# Patient Record
Sex: FEMALE | Race: WHITE | NOT HISPANIC OR LATINO | Employment: FULL TIME | URBAN - METROPOLITAN AREA
[De-identification: names, ages, dates, MRNs, and addresses within clinical notes are randomized per-mention and may not be internally consistent; named-entity substitution may affect disease eponyms.]

---

## 2024-06-07 RX ORDER — HYDROXYCHLOROQUINE SULFATE 200 MG/1
TABLET, FILM COATED ORAL
Qty: 180 TABLET | Refills: 0 | Status: SHIPPED | OUTPATIENT
Start: 2024-06-07

## 2024-07-24 PROBLEM — Z79.899 ENCOUNTER FOR LONG-TERM (CURRENT) USE OF HIGH-RISK MEDICATION: Status: ACTIVE | Noted: 2024-07-24

## 2024-07-24 PROBLEM — E55.9 VITAMIN D DEFICIENCY: Status: ACTIVE | Noted: 2024-07-24

## 2024-07-24 PROBLEM — M05.9 RHEUMATOID ARTHRITIS WITH POSITIVE RHEUMATOID FACTOR: Status: ACTIVE | Noted: 2024-07-24

## 2024-07-24 PROBLEM — G40.909 SEIZURE DISORDER: Status: ACTIVE | Noted: 2024-07-24

## 2024-07-24 PROBLEM — M85.80 OSTEOPENIA: Status: ACTIVE | Noted: 2024-07-24

## 2024-07-24 PROBLEM — Z79.1 ENCOUNTER FOR LONG-TERM (CURRENT) USE OF NSAIDS: Status: ACTIVE | Noted: 2024-07-24

## 2024-07-24 NOTE — ASSESSMENT & PLAN NOTE
[facility] mckinley note  referred by hand specialist Dr. Darin JI ([place]) who her  saw for thumb  +RF 41, +CCP antibody>300 (Dec 2019); saw Dr. Palmer once February 2020 2/2020 hand films with mild deg changes  2/2024 hand films with inflammatory changes multiple PIP joints  **Current:  Methotrexate 7/2020, Plaquenil 3/21, leucovorin, ibuprofen PRN.    Low disease activity on methotrexate and Plaquenil.    Swollen joint count 1. Tender joint count 0.  Good prognosis  She previously tried to come off Plaquenil but had worsening joint pain, so she restarted the Plaquenil  Continue methotrexate and Plaquenil.  Well tolerated.    We lowered methotrexate dose to 4 tablets once weekly and prescribed once weekly leucovorin in light of her nausea complaint. Nausea has resolved.  Continue yearly eye exam for Plaquenil monitoring- UTD 9/28/23  Labs every 8-12 weeks for monitoring.   Labs 6/7/2024 stable.  Standing order provided today.  RTC 4-6 months

## 2024-07-24 NOTE — ASSESSMENT & PLAN NOTE
Continue ibuprofen as needed    Risks of NSAIDs discussed including GI upset, GI bleeding, renal and hepatic risks and the risks of cardiovascular disease and stroke. Warned patient not to take with other NSAIDs including OTC NSAIDs

## 2024-07-24 NOTE — ASSESSMENT & PLAN NOTE
DEXA 2020 [facility] - mild osteopenia  DEXA ACL 9/14/2023-normal bone density  Vitamin D weekly helps her hip pain, so she will stay on this indefinitely.    She takes 50,000 IU of vitamin D weekly- She reports when she does not take her weekly vitamin D for a couple weeks, she has severe joint pain and stiffness  Continue calcium vitamin-D and weight-bearing exercise

## 2024-07-24 NOTE — PROGRESS NOTES
Office Visit       Date: 07/25/2024   Patient Name: Alexa Camargo  MRN: [id number]  YOB: 1965    Referring Physician: Carmelina Choudhary APRN     Chief Complaint: No chief complaint on file.      History of Present Illness: Alexa Camargo is a 58 y.o. female who is here today for follow-up of seropositive rheumatoid arthritis on methotrexate and Plaquenil.      Today she rates her pain 0/10.  She denies morning stiffness.  She is tolerating Plaquenil and methotrexate well. Her nausea has improved now that she is taking leucovorin weekly.   No rheumatoid flares in the interim.  No recent infections.  Shoulder pain and CMC pain has improved.    She takes ibuprofen as needed, but she uses this rarely.  She is taking prescription vitamin D weekly which really helps her joint pain.  She notes nodule right forearm and left bicep.      Subjective   Review of systems:  Review of Systems   All other systems reviewed and are negative.      Past Medical History:   Past Medical History:   Diagnosis Date    Anxiety     panic attacks    Low vitamin D level     Osteopenia     Seizure disorder     Seropositive rheumatoid arthritis        Past Surgical History:   Past Surgical History:   Procedure Laterality Date    APPENDECTOMY      DILATATION AND CURETTAGE         Family History:   Family History   Problem Relation Age of Onset    Osteoporosis Mother     Heart failure Mother     Seizures Mother         disorder    Arthritis Mother     Heart failure Father     Diabetes Father     Arthritis Father     Skin cancer Father     Hypertension Sister     Other Brother         acid reflux       Social History:   Social History     Socioeconomic History    Marital status:    Tobacco Use    Smoking status: Never    Smokeless tobacco: Never   Vaping Use    Vaping status: Never Used   Substance and Sexual Activity    Alcohol use: Never    Drug use: Never    Sexual activity: Defer       Medications:   Current  "Outpatient Medications:     estradiol-norethindrone (ACTIVELLA) 1-0.5 MG per tablet, Take 1 tablet by mouth Daily., Disp: , Rfl:     hydroxychloroquine (PLAQUENIL) 200 MG tablet, Take 1 tablet by mouth 2 (Two) Times a Day., Disp: 180 tablet, Rfl: 0    ibuprofen (ADVIL,MOTRIN) 800 MG tablet, Take 1 tablet by mouth 3 (Three) Times a Day As Needed for Mild Pain. With food, Disp: 90 tablet, Rfl: 3    lamoTRIgine (LaMICtal) 200 MG tablet, Take 1 tablet by mouth 2 (Two) Times a Day., Disp: , Rfl:     leucovorin 10 MG tablet, Take 1 tablet by mouth 1 (One) Time Per Week. Take 24 hrs after methotrexate dose, Disp: 4 tablet, Rfl: 3    methotrexate 2.5 MG tablet, Take 4 tablets by mouth 1 (One) Time Per Week. For rheumatoid arthritis, Disp: 20 tablet, Rfl: 3    primidone (MYSOLINE) 250 MG tablet, Take 1 tablet by mouth 2 (Two) Times a Day. For maintenance, Disp: , Rfl:     calcium carbonate (OS-VENANCIO) 1250 (500 Ca) MG tablet, Take 1 tablet by mouth Daily., Disp: , Rfl:     ergocalciferol (ERGOCALCIFEROL) 1.25 MG (69244 UT) capsule, Take 1 capsule by mouth 1 (One) Time Per Week., Disp: 4 capsule, Rfl: 3    Allergies:   Allergies   Allergen Reactions    Amoxicillin Unknown - Low Severity    Nitrofurantoin Macrocrystal Unknown - Low Severity       I reviewed the patient's chief complaint, history of present illness, review of systems, past medical history, surgical history, family history, social history, medications and allergy list.     Objective    Vital Signs:   Vitals:    07/25/24 1248   BP: 138/80   BP Location: Left arm   Pulse: 72   Temp: 98.2 °F (36.8 °C)   Weight: 77.1 kg (170 lb)   Height: 160 cm (63\")   PainSc: 0-No pain     Body mass index is 30.11 kg/m².   BMI cannot be calculated due to outdated height or weight values.  Please input a current height/weight in Vitals and re-renter BMIFOLLOWUP in Note to pull in correct documentation based on BMI range.       Physical Exam:  Physical Exam  Constitutional:       " Appearance: Normal appearance.   HENT:      Head: Normocephalic and atraumatic.   Eyes:      Conjunctiva/sclera: Conjunctivae normal.      Pupils: Pupils are equal, round, and reactive to light.   Cardiovascular:      Rate and Rhythm: Normal rate and regular rhythm.      Heart sounds: Normal heart sounds.   Pulmonary:      Effort: Pulmonary effort is normal.      Breath sounds: Normal breath sounds.   Musculoskeletal:         General: Normal range of motion.      Cervical back: Normal range of motion.      Comments: No synovitis. No joint deformity.   Mild synovitis right third PIP  Osteoarthritic changes seen in the hands  ?  Rheumatoid nodule left bicep and right forearm   Skin:     General: Skin is warm and dry.   Neurological:      General: No focal deficit present.      Mental Status: She is alert and oriented to person, place, and time.   Psychiatric:         Mood and Affect: Mood normal.         Behavior: Behavior normal.         Thought Content: Thought content normal.         Judgment: Judgment normal.         Metrics  MATIAS-28 (ESR): 0.77 (Remission)  MATIAS-28 (CRP): 1.49 (Remission)  Tender (MATIAS-28): 0 / 28   Swollen (MATIAS-28): 1 / 28     This Exam  Provider Global: 1 mm  Patient Global: 0 mm  ESR: 2 mm/hr  CRP: 1 mg/L         Results Review:   Imaging Results (Last 24 Hours)       ** No results found for the last 24 hours. **            Assessment / Plan    Assessment/Plan:   Diagnoses and all orders for this visit:    1. Rheumatoid arthritis with positive rheumatoid factor, involving unspecified site (Primary)  Assessment & Plan:   [employer] [place]  referred by hand specialist Dr. Darin JI ([facility]) who her  saw for thumb  +RF 41, +CCP antibody>300 (Dec 2019); saw Dr. Palmer once February 2020 2/2020 hand films with mild deg changes  2/2024 hand films with inflammatory changes multiple PIP joints  **Current:  Methotrexate 7/2020, Plaquenil 3/21, leucovorin,  ibuprofen PRN.    Low disease activity on methotrexate and Plaquenil.    Swollen joint count 1. Tender joint count 0.  Good prognosis  She previously tried to come off Plaquenil but had worsening joint pain, so she restarted the Plaquenil  Continue methotrexate and Plaquenil.  Well tolerated.    We lowered methotrexate dose to 4 tablets once weekly and prescribed once weekly leucovorin in light of her nausea complaint. Nausea has resolved.  Continue yearly eye exam for Plaquenil monitoring- UTD 9/28/23  Labs every 8-12 weeks for monitoring.   Labs 6/7/2024 stable.  Standing order provided today.  RTC 4-6 months    Orders:  -     leucovorin 10 MG tablet; Take 1 tablet by mouth 1 (One) Time Per Week. Take 24 hrs after methotrexate dose  Dispense: 4 tablet; Refill: 3  -     hydroxychloroquine (PLAQUENIL) 200 MG tablet; Take 1 tablet by mouth 2 (Two) Times a Day.  Dispense: 180 tablet; Refill: 0  -     ibuprofen (ADVIL,MOTRIN) 800 MG tablet; Take 1 tablet by mouth 3 (Three) Times a Day As Needed for Mild Pain. With food  Dispense: 90 tablet; Refill: 3  -     methotrexate 2.5 MG tablet; Take 4 tablets by mouth 1 (One) Time Per Week. For rheumatoid arthritis  Dispense: 20 tablet; Refill: 3  -     Comprehensive Metabolic Panel; Standing  -     C-reactive Protein; Standing  -     Sedimentation Rate; Standing  -     CBC Auto Differential; Standing    2. Encounter for long-term (current) use of high-risk medication  Assessment & Plan:  Continue Plaquenil and methotrexate.  Continue labs every 8 to 12 weeks.  Continue annual eye exams.    We have discussed the side effects of hydroxychloroquine including but not limited to GI upset, rash, photosensitivity, Hematologic and liver abnormalities and ocular abnormalities and need for frequent eye exam for toxicity monitoring    Discussed risks of methotrexate.  Risks include but are not limited to severe liver damage that can be fatal, the possible need for liver biopsy, bone  marrow suppression that can lead to dangerously low blood counts, GI side effects including mouth sores and diarrhea, fatigue, and rare risk of severe pulmonary complications. There should be no alcohol consumed with MTX. MTX can cause severe fetal abnormalities whether the mother or father is taking the medication and thus must be avoided if pregnancy is a possibility.  All medication is to be taken one day a week only. The need for q 8 week labs and the need for folic acid supplementation were discussed    Orders:  -     leucovorin 10 MG tablet; Take 1 tablet by mouth 1 (One) Time Per Week. Take 24 hrs after methotrexate dose  Dispense: 4 tablet; Refill: 3  -     hydroxychloroquine (PLAQUENIL) 200 MG tablet; Take 1 tablet by mouth 2 (Two) Times a Day.  Dispense: 180 tablet; Refill: 0  -     methotrexate 2.5 MG tablet; Take 4 tablets by mouth 1 (One) Time Per Week. For rheumatoid arthritis  Dispense: 20 tablet; Refill: 3  -     Comprehensive Metabolic Panel; Standing  -     C-reactive Protein; Standing  -     Sedimentation Rate; Standing  -     CBC Auto Differential; Standing    3. Osteopenia, unspecified location  Assessment & Plan:  DEXA 2020 [facility] - mild osteopenia  DEXA ACL 9/14/2023-normal bone density  Vitamin D weekly helps her hip pain, so she will stay on this indefinitely.    She takes 50,000 IU of vitamin D weekly- She reports when she does not take her weekly vitamin D for a couple weeks, she has severe joint pain and stiffness  Continue calcium vitamin-D and weight-bearing exercise      4. Vitamin D deficiency  Assessment & Plan:  Continue once weekly vitamin D2 50,000 units. She can definitely tell when she does not take the weekly vitamin-D.  3/8/2024 level was 73.7    Orders:  -     ergocalciferol (ERGOCALCIFEROL) 1.25 MG (79197 UT) capsule; Take 1 capsule by mouth 1 (One) Time Per Week.  Dispense: 4 capsule; Refill: 3    5. Encounter for long-term (current) use of  NSAIDs  Assessment & Plan:  Continue ibuprofen as needed    Risks of NSAIDs discussed including GI upset, GI bleeding, renal and hepatic risks and the risks of cardiovascular disease and stroke. Warned patient not to take with other NSAIDs including OTC NSAIDs    Orders:  -     Comprehensive Metabolic Panel; Standing  -     C-reactive Protein; Standing  -     Sedimentation Rate; Standing  -     CBC Auto Differential; Standing    6. Seizure disorder  Assessment & Plan:  Followed by Neurology in [place]  Stable      7. CMC arthritis  Assessment & Plan:  Hand films 2/2024 with narrowing of CMC joint space bilaterally    Continue home exercises   Continue ibuprofen.   Can try topical Voltaren gel. Recommend splints.   Can try injection if she would like- today she declined.    Orders:  -     ibuprofen (ADVIL,MOTRIN) 800 MG tablet; Take 1 tablet by mouth 3 (Three) Times a Day As Needed for Mild Pain. With food  Dispense: 90 tablet; Refill: 3        Follow Up:   Return in about 6 months (around 1/25/2025) for Dr. Burton.        JUSTIN Castellon  [facility]

## 2024-07-24 NOTE — ASSESSMENT & PLAN NOTE
Continue Plaquenil and methotrexate.  Continue labs every 8 to 12 weeks.  Continue annual eye exams.    We have discussed the side effects of hydroxychloroquine including but not limited to GI upset, rash, photosensitivity, Hematologic and liver abnormalities and ocular abnormalities and need for frequent eye exam for toxicity monitoring    Discussed risks of methotrexate.  Risks include but are not limited to severe liver damage that can be fatal, the possible need for liver biopsy, bone marrow suppression that can lead to dangerously low blood counts, GI side effects including mouth sores and diarrhea, fatigue, and rare risk of severe pulmonary complications. There should be no alcohol consumed with MTX. MTX can cause severe fetal abnormalities whether the mother or father is taking the medication and thus must be avoided if pregnancy is a possibility.  All medication is to be taken one day a week only. The need for q 8 week labs and the need for folic acid supplementation were discussed

## 2024-07-24 NOTE — ASSESSMENT & PLAN NOTE
Continue once weekly vitamin D2 50,000 units. She can definitely tell when she does not take the weekly vitamin-D.  3/8/2024 level was 73.7

## 2024-07-25 ENCOUNTER — OFFICE VISIT (OUTPATIENT)
Age: 59
End: 2024-07-25
Payer: COMMERCIAL

## 2024-07-25 VITALS
HEIGHT: 63 IN | SYSTOLIC BLOOD PRESSURE: 138 MMHG | WEIGHT: 170 LBS | HEART RATE: 72 BPM | DIASTOLIC BLOOD PRESSURE: 80 MMHG | TEMPERATURE: 98.2 F | BODY MASS INDEX: 30.12 KG/M2

## 2024-07-25 DIAGNOSIS — M85.80 OSTEOPENIA, UNSPECIFIED LOCATION: ICD-10-CM

## 2024-07-25 DIAGNOSIS — E55.9 VITAMIN D DEFICIENCY: ICD-10-CM

## 2024-07-25 DIAGNOSIS — G40.909 SEIZURE DISORDER: ICD-10-CM

## 2024-07-25 DIAGNOSIS — M05.9 RHEUMATOID ARTHRITIS WITH POSITIVE RHEUMATOID FACTOR, INVOLVING UNSPECIFIED SITE: Primary | ICD-10-CM

## 2024-07-25 DIAGNOSIS — Z79.1 ENCOUNTER FOR LONG-TERM (CURRENT) USE OF NSAIDS: ICD-10-CM

## 2024-07-25 DIAGNOSIS — Z79.899 ENCOUNTER FOR LONG-TERM (CURRENT) USE OF HIGH-RISK MEDICATION: ICD-10-CM

## 2024-07-25 DIAGNOSIS — M19.049 CMC ARTHRITIS: ICD-10-CM

## 2024-07-25 PROCEDURE — 99214 OFFICE O/P EST MOD 30 MIN: CPT | Performed by: NURSE PRACTITIONER

## 2024-07-25 RX ORDER — HYDROXYCHLOROQUINE SULFATE 200 MG/1
200 TABLET, FILM COATED ORAL 2 TIMES DAILY
Qty: 180 TABLET | Refills: 0 | Status: SHIPPED | OUTPATIENT
Start: 2024-07-25

## 2024-07-25 RX ORDER — IBUPROFEN 200 MG
1 CAPSULE ORAL DAILY
COMMUNITY

## 2024-07-25 RX ORDER — METHOTREXATE 2.5 MG/1
10 TABLET ORAL WEEKLY
Qty: 20 TABLET | Refills: 3 | Status: SHIPPED | OUTPATIENT
Start: 2024-07-25

## 2024-07-25 RX ORDER — LEUCOVORIN CALCIUM 10 MG/1
10 TABLET ORAL WEEKLY
Qty: 4 TABLET | Refills: 3 | Status: SHIPPED | OUTPATIENT
Start: 2024-07-25

## 2024-07-25 RX ORDER — ERGOCALCIFEROL 1.25 MG/1
50000 CAPSULE ORAL WEEKLY
Qty: 4 CAPSULE | Refills: 3 | Status: SHIPPED | OUTPATIENT
Start: 2024-07-25 | End: 2025-07-25

## 2024-07-25 RX ORDER — IBUPROFEN 800 MG/1
800 TABLET ORAL 3 TIMES DAILY PRN
Qty: 90 TABLET | Refills: 3 | Status: SHIPPED | OUTPATIENT
Start: 2024-07-25

## 2024-07-25 NOTE — ASSESSMENT & PLAN NOTE
Hand films 2/2024 with narrowing of CMC joint space bilaterally    Continue home exercises   Continue ibuprofen.   Can try topical Voltaren gel. Recommend splints.   Can try injection if she would like- today she declined.

## 2025-01-23 ENCOUNTER — TELEPHONE (OUTPATIENT)
Age: 60
End: 2025-01-23

## 2025-01-23 ENCOUNTER — OFFICE VISIT (OUTPATIENT)
Age: 60
End: 2025-01-23
Payer: COMMERCIAL

## 2025-01-23 VITALS
WEIGHT: 177.6 LBS | HEIGHT: 63 IN | HEART RATE: 83 BPM | BODY MASS INDEX: 31.47 KG/M2 | TEMPERATURE: 97.6 F | DIASTOLIC BLOOD PRESSURE: 76 MMHG | SYSTOLIC BLOOD PRESSURE: 128 MMHG

## 2025-01-23 DIAGNOSIS — M05.9 RHEUMATOID ARTHRITIS WITH POSITIVE RHEUMATOID FACTOR, INVOLVING UNSPECIFIED SITE: Primary | ICD-10-CM

## 2025-01-23 DIAGNOSIS — Z79.899 ENCOUNTER FOR LONG-TERM (CURRENT) USE OF HIGH-RISK MEDICATION: ICD-10-CM

## 2025-01-23 PROCEDURE — 99214 OFFICE O/P EST MOD 30 MIN: CPT | Performed by: INTERNAL MEDICINE

## 2025-01-23 RX ORDER — LEUCOVORIN CALCIUM 10 MG/1
10 TABLET ORAL WEEKLY
Qty: 4 TABLET | Refills: 5 | Status: SHIPPED | OUTPATIENT
Start: 2025-01-23

## 2025-01-23 RX ORDER — METHOTREXATE 2.5 MG/1
10 TABLET ORAL WEEKLY
Qty: 20 TABLET | Refills: 3 | Status: SHIPPED | OUTPATIENT
Start: 2025-01-23

## 2025-01-23 NOTE — PATIENT INSTRUCTIONS
Methotrexate Tablets    What is this medication?  METHOTREXATE (METH oh TREX ate) treats autoimmune conditions, such as arthritis and psoriasis. It works by decreasing inflammation, which can reduce pain and prevent long-term injury to the joints and skin. It may also be used to treat some types of cancer. It works by slowing down the growth of cancer cells.  This medicine may be used for other purposes; ask your health care provider or pharmacist if you have questions.  COMMON BRAND NAME(S): Rheumatrex, Trexall    What should I tell my care team before I take this medication?  They need to know if you have any of these conditions:  Dehydration  Diabetes  Fluid in the stomach area or lungs  Frequently drink alcohol  Having surgery, including dental surgery  High cholesterol  Immune system problems  Inflammatory bowel disease, such as ulcerative colitis  Kidney disease  Liver disease  Low blood cell levels (white cells, red cells, and platelets)  Lung disease  Recent or ongoing radiation  Recent or upcoming vaccine  Stomach ulcers, other stomach or intestine problems  An unusual or allergic reaction to methotrexate, other medications, foods, dyes, or preservatives  Pregnant or trying to get pregnant  Breastfeeding    How should I use this medication?  Take this medication by mouth with water. Take it as directed on the prescription label. Do not take extra. Keep taking this medication until your care team tells you to stop.  Know why you are taking this medication and how you should take it. To treat conditions such as arthritis and psoriasis, this medication is taken ONCE A WEEK as a single dose or divided into 3 smaller doses taken 12 hours apart (do not take more than 3 doses 12 hours apart each week). This medication is NEVER taken daily to treat conditions other than cancer. Taking this medication more often than directed can cause serious side effects, even death. Talk to your care team about why you are taking  "this medication, how often you will take it, and what your dose is. Ask your care team to put the reason you take this medication on the prescription.  If you take this medication ONCE A WEEK, choose a day of the week before you start. Ask your pharmacist to include the day of the week on the label. Avoid \"Monday\", which could be misread as \"Morning\".  Handling this medication may be harmful. Talk to your care team about how to handle this medication. Special instructions may apply.  Talk to your care team about the use of this medication in children. While it may be prescribed for selected conditions, precautions do apply.  Overdosage: If you think you have taken too much of this medicine contact a poison control center or emergency room at once.  NOTE: This medicine is only for you. Do not share this medicine with others.    What if I miss a dose?  If you miss a dose, talk with your care team. Do not take double or extra doses.    What may interact with this medication?  Do not take this medication with any of the following:  Acitretin  Live virus vaccines  Probenecid  This medication may also interact with the following:  Alcohol  Aspirin and aspirin-like medications  Certain antibiotics, such as penicillin, neomycin, sulfamethoxazole; trimethoprim  Certain medications for stomach problems, such as lansoprazole, omeprazole, pantoprazole  Clozapine  Cyclosporine  Dapsone  Folic acid  Foscarnet  NSAIDs, medications for pain and inflammation, such as ibuprofen or naproxen  Phenytoin  Pyrimethamine  Steroid medications, such as prednisone or cortisone  Tacrolimus  Theophylline  This list may not describe all possible interactions. Give your health care provider a list of all the medicines, herbs, non-prescription drugs, or dietary supplements you use. Also tell them if you smoke, drink alcohol, or use illegal drugs. Some items may interact with your medicine.    What should I watch for while using this " medication?  Visit your care team for regular checks on your progress. It may be some time before you see the benefit from this medication.  You may need blood work done while you are taking this medication.  If your care team has also prescribed folic acid, they may instruct you to skip your folic acid dose on the day you take methotrexate.  This medication can make you more sensitive to the sun. Keep out of the sun. If you cannot avoid being in the sun, wear protective clothing and sunscreen. Do not use sun lamps, tanning beds, or tanning booths.  Check with your care team if you have severe diarrhea, nausea, and vomiting, or if you sweat a lot. The loss of too much body fluid may make it dangerous for you to take this medication.  This medication may increase your risk of getting an infection. Call your care team for advice if you get a fever, chills, sore throat, or other symptoms of a cold or flu. Do not treat yourself. Try to avoid being around people who are sick.  Talk to your care team about your risk of cancer. You may be more at risk for certain types of cancers if you take this medication.  Talk to your care team if you or your partner may be pregnant. Serious birth defects can occur if you take this medication during pregnancy and for 6 months after the last dose. You will need a negative pregnancy test before starting this medication. Contraception is recommended while taking this medication and for 6 months after the last dose. Your care team can help you find the option that works for you.  If your partner can get pregnant, use a condom during sex while taking this medication and for 3 months after the last dose.  Do not breastfeed while taking this medication and for 1 week after the last dose.  This medication may cause infertility. Talk to your care team if you are concerned about your fertility.    What side effects may I notice from receiving this medication?  Side effects that you should report  to your care team as soon as possible:  Allergic reactions--skin rash, itching, hives, swelling of the face, lips, tongue, or throat  Dry cough, shortness of breath or trouble breathing  Infection--fever, chills, cough, sore throat, wounds that don't heal, pain or trouble when passing urine, general feeling of discomfort or being unwell  Kidney injury--decrease in the amount of urine, swelling of the ankles, hands, or feet  Liver injury--right upper belly pain, loss of appetite, nausea, light-colored stool, dark yellow or brown urine, yellowing skin or eyes, unusual weakness or fatigue  Low red blood cell level--unusual weakness or fatigue, dizziness, headache, trouble breathing  Pain, tingling, or numbness in the hands or feet, muscle weakness, change in vision, confusion or trouble speaking, loss of balance or coordination, trouble walking, seizures  Redness, blistering, peeling, or loosening of the skin, including inside the mouth  Stomach bleeding--bloody or black, tar-like stools, vomiting blood or brown material that looks like coffee grounds  Stomach pain that is severe, does not away, or gets worse  Unusual bruising or bleeding  Side effects that usually do not require medical attention (report these to your care team if they continue or are bothersome):  Diarrhea  Dizziness  Hair loss  Nausea  Pain, redness, or swelling with sores inside the mouth or throat  Skin reactions on sun-exposed areas  Vomiting  This list may not describe all possible side effects. Call your doctor for medical advice about side effects. You may report side effects to FDA at 3-452-FDA-2876.    Where should I keep my medication?  Keep out of the reach of children and pets.  Store at room temperature between 20 and 25 degrees C (68 and 77 degrees F). Protect from light. Keep the container tightly closed. Get rid of any unused medication after the expiration date.  To get rid of medications that are no longer needed or have  :  Take the medication to a medication take-back program. Check with your pharmacy or law enforcement to find a location.  If you cannot return the medication, ask your pharmacist or care team how to get rid of this medication safely.  NOTE: This sheet is a summary. It may not cover all possible information. If you have questions about this medicine, talk to your doctor, pharmacist, or health care provider.  ©  [employer] (2024 00:00:00)

## 2025-01-23 NOTE — PROGRESS NOTES
Office Visit       Date: 01/23/2025   Patient Name: Alexa Camargo  MRN: [id number]  YOB: 1965    Referring Physician: Carmelina Choudhary APRN     Chief Complaint:   Chief Complaint   Patient presents with    Rheumatoid Arthritis       History of Present Illness: Alexa Camargo is a 59 y.o. female who is here today for follow-up of seropositive rheumatoid arthritis on methotrexate and Plaquenil.      Today she rates her pain 0/10.  She denies morning stiffness.  She stopped Plaquenil late December 2024 due to worsening vision.  Ophthalmology reportedly saw no signs of retinal toxicity  Continues on methotrexate 10 mg once weekly and leucovorin once weekly  No rheumatoid flares in the interim.  No recent infections.  Shoulder pain and CMC pain has improved.    She takes ibuprofen as needed, but she uses this rarely.  She is taking prescription vitamin D weekly which helps her joint pain.        Subjective   Review of systems:  Review of Systems   Eyes:  Positive for visual disturbance.   Musculoskeletal:  Positive for arthralgias.   All other systems reviewed and are negative.      Past Medical History:   Past Medical History:   Diagnosis Date    Anxiety     panic attacks    Low vitamin D level     Osteopenia     Seizure disorder     Seropositive rheumatoid arthritis        Past Surgical History:   Past Surgical History:   Procedure Laterality Date    APPENDECTOMY      DILATATION AND CURETTAGE         Family History:   Family History   Problem Relation Age of Onset    Osteoporosis Mother     Heart failure Mother     Seizures Mother         disorder    Arthritis Mother     Heart failure Father     Diabetes Father     Arthritis Father     Skin cancer Father     Hypertension Sister     Other Brother         acid reflux       Social History:   Social History     Socioeconomic History    Marital status:    Tobacco Use    Smoking status: Never    Smokeless tobacco: Never   Vaping Use     "Vaping status: Never Used   Substance and Sexual Activity    Alcohol use: Never    Drug use: Never    Sexual activity: Defer       Medications:   Current Outpatient Medications:     calcium carbonate (OS-VENANCIO) 1250 (500 Ca) MG tablet, Take 1 tablet by mouth Daily., Disp: , Rfl:     ergocalciferol (ERGOCALCIFEROL) 1.25 MG (24710 UT) capsule, Take 1 capsule by mouth 1 (One) Time Per Week., Disp: 4 capsule, Rfl: 3    estradiol-norethindrone (ACTIVELLA) 1-0.5 MG per tablet, Take 1 tablet by mouth Daily., Disp: , Rfl:     ibuprofen (ADVIL,MOTRIN) 800 MG tablet, Take 1 tablet by mouth 3 (Three) Times a Day As Needed for Mild Pain. With food, Disp: 90 tablet, Rfl: 3    lamoTRIgine (LaMICtal) 200 MG tablet, Take 1 tablet by mouth 2 (Two) Times a Day., Disp: , Rfl:     leucovorin 10 MG tablet, Take 1 tablet by mouth 1 (One) Time Per Week. Take 24 hrs after methotrexate dose, Disp: 4 tablet, Rfl: 5    methotrexate 2.5 MG tablet, Take 4 tablets by mouth 1 (One) Time Per Week. For rheumatoid arthritis, Disp: 20 tablet, Rfl: 3    primidone (MYSOLINE) 250 MG tablet, Take 1 tablet by mouth 2 (Two) Times a Day. For maintenance, Disp: , Rfl:     Allergies:   Allergies   Allergen Reactions    Amoxicillin Unknown - Low Severity    Nitrofurantoin Macrocrystal Unknown - Low Severity       I have reviewed and updated the patient's chief complaint, history of present illness, review of systems, past medical history, surgical history, family history, social history, medications and allergy list, physical exam, assessment and plan as appropriate.     Objective    Vital Signs:   Vitals:    01/23/25 1041   BP: 128/76   BP Location: Left arm   Patient Position: Sitting   Cuff Size: Adult   Pulse: 83   Temp: 97.6 °F (36.4 °C)   Weight: 80.6 kg (177 lb 9.6 oz)   Height: 160 cm (63\")   PainSc: 0-No pain       Body mass index is 31.46 kg/m².        Physical Exam:  MUSCULOSKELETAL:   No peripheral synovitis  OA changes hands    Complete joint exam " was performed including the MCPs, PIPs, DIPs of the hands, wrists, elbows, shoulders, hips, knees and ankles.  No soft tissue swelling or tenderness is present except as above.    General: The patient is well-developed and well nourished. Cooperative, alert and oriented. Affect is normal. Hydration appears normal.   HEENT: Normocephalic and atraumatic. Lids and conjunctiva are normal. Pupils are equal and sclera are clear. Oropharynx is clear   NECK neck is supple without adenopathy, masses or thyromegaly.   CARDIOVASCULAR: Regular rate and rhythm. No murmurs, rubs or gallops   LUNGS: Effort is normal. Lungs are clear bilateral   ABDOMEN: Not examined  EXTREMITIES: Peripheral pulses are intact. No clubbing.   SKIN: No rashes. No subcutaneous nodules. No digital ulcers. No sclerodactyly.   NEUROLOGIC: Gait is normal. Strength testing is normal.  No focal neurologic deficits       Results Review:   Imaging Results (Last 24 Hours)       ** No results found for the last 24 hours. **            Assessment / Plan      -Rheumatoid arthritis with positive rheumatoid factor  Assessment & Plan:   [facility] [place]  referred by hand specialist Dr. Clifford Webster ([facility]) who her  saw for thumb  +RF 41, +CCP antibody>300 (Dec 2019); saw Dr. Palmer once February 2020 2/2020 hand films with mild deg changes  2/2024 hand films with inflammatory changes multiple PIP joints  **Current rx:  Methotrexate 7/2020, leucovorin, ibuprofen PRN.  Prior hydroxychloroquine 3/21-12/24(patient stopped due to vision changes, but no retinal toxicity seen by ophthalmology)       Low disease activity on methotrexate 10 mg once weekly  Swollen joint count 0. Tender joint count 0.  Good prognosis    -Stopped hydroxychloroquine late December 2024 due to vision changes.  Reportedly ophthalmology showed no signs of hydroxychloroquine retinal toxicity.  Remain off hydroxychloroquine  -Continue methotrexate 10 mg once weekly  and once weekly leucovorin.  Well tolerated.    -Labs every 12 weeks for monitoring.   -Labs from [facility] done 12/19/2024 reviewed on patient's phone and are stable.    Request hardcopy labs from [facility] [employer] they did not send to us.    Standing order provided today for labs every 12 weeks CBC CMP sed rate CRP.  RTC 6 months       -Encounter for long-term (current) use of high-risk medication  Assessment & Plan:  methotrexate.    Discussed risks of methotrexate.  Risks include but are not limited to severe liver damage that can be fatal, the possible need for liver biopsy, bone marrow suppression that can lead to dangerously low blood counts, GI side effects including mouth sores and diarrhea, fatigue, and rare risk of severe pulmonary complications. There should be no alcohol consumed with MTX. MTX can cause severe fetal abnormalities whether the mother or father is taking the medication and thus must be avoided if pregnancy is a possibility.  All medication is to be taken one day a week only. The need for q 8-12 week labs and the need for folic acid supplementation were discussed       -Osteopenia, unspecified location  Assessment & Plan:  DEXA 2020 [facility] - mild osteopenia  DEXA ACL 9/14/2023-normal bone density  Vitamin D weekly helps her hip pain, so she will stay on this indefinitely.     She takes 50,000 IU of vitamin D weekly- She reports when she does not take her weekly vitamin D for a couple weeks, she has severe joint pain and stiffness  Continue calcium vitamin-D and weight-bearing exercise    -CMC osteoarthritis  Assessment & Plan:  Hand films 2/2024 with narrowing of CMC joint space bilaterally     Continue home exercises   Continue ibuprofen.   Can try topical Voltaren gel. Recommend splints.   Can try injection if she would like-she will let us know if she wants this       -Encounter for long-term (current) use of NSAIDs  Assessment & Plan:  Continue ibuprofen as needed     Risks of  NSAIDs discussed including GI upset, GI bleeding, renal and hepatic risks and the risks of cardiovascular disease and stroke. Warned patient not to take with other NSAIDs including OTC NSAIDs       -Seizure disorder  Assessment & Plan:  Followed by Neurology in [facility]              Assessment/Plan:   Diagnoses and all orders for this visit:    1. Rheumatoid arthritis with positive rheumatoid factor, involving unspecified site (Primary)  -     CBC Auto Differential; Standing  -     Comprehensive Metabolic Panel; Standing  -     C-reactive Protein; Standing  -     Sedimentation Rate; Standing  -     Comprehensive Metabolic Panel; Standing  -     CBC Auto Differential; Standing  -     C-reactive Protein; Standing  -     Sedimentation Rate; Standing  -     leucovorin 10 MG tablet; Take 1 tablet by mouth 1 (One) Time Per Week. Take 24 hrs after methotrexate dose  Dispense: 4 tablet; Refill: 5  -     methotrexate 2.5 MG tablet; Take 4 tablets by mouth 1 (One) Time Per Week. For rheumatoid arthritis  Dispense: 20 tablet; Refill: 3    2. Encounter for long-term (current) use of high-risk medication  -     CBC Auto Differential; Standing  -     Comprehensive Metabolic Panel; Standing  -     C-reactive Protein; Standing  -     Sedimentation Rate; Standing  -     Comprehensive Metabolic Panel; Standing  -     CBC Auto Differential; Standing  -     C-reactive Protein; Standing  -     Sedimentation Rate; Standing  -     leucovorin 10 MG tablet; Take 1 tablet by mouth 1 (One) Time Per Week. Take 24 hrs after methotrexate dose  Dispense: 4 tablet; Refill: 5  -     methotrexate 2.5 MG tablet; Take 4 tablets by mouth 1 (One) Time Per Week. For rheumatoid arthritis  Dispense: 20 tablet; Refill: 3          Follow Up:   Return in about 6 months (around 7/23/2025).        Regan Burton MD  [facility]

## 2025-01-23 NOTE — TELEPHONE ENCOUNTER
Called [facility] in [facility]. [phone]. Requested recent visit notes from [facility].

## 2025-01-24 DIAGNOSIS — M19.049 CMC ARTHRITIS: ICD-10-CM

## 2025-01-24 DIAGNOSIS — E55.9 VITAMIN D DEFICIENCY: ICD-10-CM

## 2025-01-24 DIAGNOSIS — M05.9 RHEUMATOID ARTHRITIS WITH POSITIVE RHEUMATOID FACTOR, INVOLVING UNSPECIFIED SITE: ICD-10-CM

## 2025-01-27 RX ORDER — ERGOCALCIFEROL 1.25 MG/1
50000 CAPSULE ORAL WEEKLY
Qty: 4 CAPSULE | Refills: 3 | Status: SHIPPED | OUTPATIENT
Start: 2025-01-27 | End: 2026-01-27

## 2025-01-27 RX ORDER — IBUPROFEN 800 MG/1
800 TABLET, FILM COATED ORAL 3 TIMES DAILY PRN
Qty: 90 TABLET | Refills: 3 | Status: SHIPPED | OUTPATIENT
Start: 2025-01-27

## 2025-06-13 LAB
ALBUMIN SERPL-MCNC: 4.6 G/DL (ref 3.8–4.9)
ALP SERPL-CCNC: 81 IU/L (ref 44–121)
ALT SERPL-CCNC: 14 IU/L (ref 0–32)
AMBIG ABBREV CMP14 DEFAULT: NORMAL
AST SERPL-CCNC: 18 IU/L (ref 0–40)
BASOPHILS # BLD AUTO: 0 X10E3/UL (ref 0–0.2)
BASOPHILS NFR BLD AUTO: 0 %
BILIRUB SERPL-MCNC: 0.3 MG/DL (ref 0–1.2)
BUN SERPL-MCNC: 14 MG/DL (ref 6–24)
BUN/CREAT SERPL: 14 (ref 9–23)
CALCIUM SERPL-MCNC: 9.7 MG/DL (ref 8.7–10.2)
CHLORIDE SERPL-SCNC: 103 MMOL/L (ref 96–106)
CO2 SERPL-SCNC: 22 MMOL/L (ref 20–29)
CREAT SERPL-MCNC: 0.97 MG/DL (ref 0.57–1)
CRP SERPL-MCNC: <1 MG/L (ref 0–10)
EGFRCR SERPLBLD CKD-EPI 2021: 68 ML/MIN/1.73
EOSINOPHIL # BLD AUTO: 0.1 X10E3/UL (ref 0–0.4)
EOSINOPHIL NFR BLD AUTO: 1 %
ERYTHROCYTE [DISTWIDTH] IN BLOOD BY AUTOMATED COUNT: 12.1 % (ref 11.7–15.4)
ERYTHROCYTE [SEDIMENTATION RATE] IN BLOOD BY WESTERGREN METHOD: 2 MM/HR (ref 0–40)
GLOBULIN SER CALC-MCNC: 2.5 G/DL (ref 1.5–4.5)
GLUCOSE SERPL-MCNC: 79 MG/DL (ref 70–99)
HCT VFR BLD AUTO: 42.7 % (ref 34–46.6)
HGB BLD-MCNC: 14 G/DL (ref 11.1–15.9)
IMM GRANULOCYTES # BLD AUTO: 0 X10E3/UL (ref 0–0.1)
IMM GRANULOCYTES NFR BLD AUTO: 0 %
LYMPHOCYTES # BLD AUTO: 2.2 X10E3/UL (ref 0.7–3.1)
LYMPHOCYTES NFR BLD AUTO: 30 %
MCH RBC QN AUTO: 32.5 PG (ref 26.6–33)
MCHC RBC AUTO-ENTMCNC: 32.8 G/DL (ref 31.5–35.7)
MCV RBC AUTO: 99 FL (ref 79–97)
MONOCYTES # BLD AUTO: 0.6 X10E3/UL (ref 0.1–0.9)
MONOCYTES NFR BLD AUTO: 8 %
NEUTROPHILS # BLD AUTO: 4.6 X10E3/UL (ref 1.4–7)
NEUTROPHILS NFR BLD AUTO: 61 %
PLATELET # BLD AUTO: 255 X10E3/UL (ref 150–450)
POTASSIUM SERPL-SCNC: 4.6 MMOL/L (ref 3.5–5.2)
PROT SERPL-MCNC: 7.1 G/DL (ref 6–8.5)
RBC # BLD AUTO: 4.31 X10E6/UL (ref 3.77–5.28)
SODIUM SERPL-SCNC: 140 MMOL/L (ref 134–144)
WBC # BLD AUTO: 7.5 X10E3/UL (ref 3.4–10.8)

## 2025-07-08 ENCOUNTER — TELEPHONE (OUTPATIENT)
Age: 60
End: 2025-07-08
Payer: COMMERCIAL

## 2025-07-08 DIAGNOSIS — M05.9 RHEUMATOID ARTHRITIS WITH POSITIVE RHEUMATOID FACTOR, INVOLVING UNSPECIFIED SITE: ICD-10-CM

## 2025-07-08 DIAGNOSIS — M19.049 CMC ARTHRITIS: ICD-10-CM

## 2025-07-08 DIAGNOSIS — Z79.899 ENCOUNTER FOR LONG-TERM (CURRENT) USE OF HIGH-RISK MEDICATION: ICD-10-CM

## 2025-07-08 RX ORDER — IBUPROFEN 800 MG/1
800 TABLET, FILM COATED ORAL 3 TIMES DAILY PRN
Qty: 90 TABLET | Refills: 0 | Status: SHIPPED | OUTPATIENT
Start: 2025-07-08

## 2025-07-08 RX ORDER — METHOTREXATE 2.5 MG/1
10 TABLET ORAL WEEKLY
Qty: 20 TABLET | Refills: 0 | Status: SHIPPED | OUTPATIENT
Start: 2025-07-08

## 2025-07-08 NOTE — TELEPHONE ENCOUNTER
Labs are current and in chart from June '25 and pt has RTC on 7/24/25.  I pend/send rx for MTX and Ibuprofen to Dr. Burton due to warning. -BRANDON NavarroA

## 2025-07-08 NOTE — TELEPHONE ENCOUNTER
Caller: [place] [facility] - [place] - [address] - [phone]  - [phone] FX    Relationship: Pharmacy      Requested Prescriptions:     methotrexate 2.5 MG tablet    ibuprofen (ADVIL,MOTRIN) 800 MG tablet         Last office visit with prescribing clinician: 1/23/2025     Next office visit with prescribing clinician: 7/24/2025       Does the patient have less than a 3 day supply:  [] Yes  [x] No    Would you like a call back once the refill request has been completed: [x] Yes [] No    If the office needs to give you a call back, can they leave a voicemail: [x] Yes [] No    Ollie Sullivan Rep   07/08/25 08:59 EDT

## 2025-07-24 ENCOUNTER — OFFICE VISIT (OUTPATIENT)
Age: 60
End: 2025-07-24
Payer: COMMERCIAL

## 2025-07-24 VITALS
HEART RATE: 78 BPM | DIASTOLIC BLOOD PRESSURE: 74 MMHG | BODY MASS INDEX: 30.65 KG/M2 | HEIGHT: 63 IN | WEIGHT: 173 LBS | SYSTOLIC BLOOD PRESSURE: 124 MMHG | TEMPERATURE: 97.3 F

## 2025-07-24 DIAGNOSIS — Z79.899 ENCOUNTER FOR LONG-TERM (CURRENT) USE OF HIGH-RISK MEDICATION: ICD-10-CM

## 2025-07-24 DIAGNOSIS — M05.9 RHEUMATOID ARTHRITIS WITH POSITIVE RHEUMATOID FACTOR, INVOLVING UNSPECIFIED SITE: Primary | ICD-10-CM

## 2025-07-24 DIAGNOSIS — Z79.899 IMMUNOSUPPRESSION DUE TO DRUG THERAPY: ICD-10-CM

## 2025-07-24 DIAGNOSIS — D84.821 IMMUNOSUPPRESSION DUE TO DRUG THERAPY: ICD-10-CM

## 2025-07-24 DIAGNOSIS — E55.9 VITAMIN D DEFICIENCY: ICD-10-CM

## 2025-07-24 PROCEDURE — 99214 OFFICE O/P EST MOD 30 MIN: CPT | Performed by: INTERNAL MEDICINE

## 2025-07-24 RX ORDER — HYDROXYCHLOROQUINE SULFATE 200 MG/1
200 TABLET, FILM COATED ORAL DAILY
COMMUNITY
Start: 2025-06-01 | End: 2025-07-24 | Stop reason: SDUPTHER

## 2025-07-24 RX ORDER — METHOTREXATE 2.5 MG/1
10 TABLET ORAL WEEKLY
Qty: 48 TABLET | Refills: 0 | Status: SHIPPED | OUTPATIENT
Start: 2025-07-24

## 2025-07-24 RX ORDER — LEUCOVORIN CALCIUM 10 MG/1
10 TABLET ORAL WEEKLY
Qty: 4 TABLET | Refills: 5 | Status: SHIPPED | OUTPATIENT
Start: 2025-07-24 | End: 2025-07-24 | Stop reason: SDUPTHER

## 2025-07-24 RX ORDER — LEUCOVORIN CALCIUM 10 MG/1
10 TABLET ORAL WEEKLY
Qty: 12 TABLET | Refills: 3 | Status: SHIPPED | OUTPATIENT
Start: 2025-07-24

## 2025-07-24 RX ORDER — HYDROXYCHLOROQUINE SULFATE 200 MG/1
200 TABLET, FILM COATED ORAL DAILY
Qty: 90 TABLET | Refills: 1 | Status: SHIPPED | OUTPATIENT
Start: 2025-07-24

## 2025-07-24 NOTE — PROGRESS NOTES
Office Follow Up      Date: 07/24/2025   Patient Name: Alexa Camargo  MRN: [id number]  YOB: 1965    Referring Physician: No ref. provider found     Chief Complaint:   Chief Complaint   Patient presents with    Rheumatoid Arthritis       History of Present Illness: Alexa Camargo is a 59 y.o. female who is here today for follow up on rheumatoid arthritis       Today she rates her pain 0/10.  She denies morning stiffness.  She was having ankle pain in June and therefore restarted Plaquenil 200 mg daily.  This has resolved the ankle pain  Continues on methotrexate 10 mg once weekly and leucovorin once weekly  No swollen joints.  No recent infections.  She takes ibuprofen as needed, but she uses this rarely.    History of Present Illness        Subjective       Review of Systems: Review of Systems   Constitutional:  Negative for chills, fatigue, fever and unexpected weight loss.   HENT:  Negative for mouth sores, sinus pressure and sore throat.    Eyes:  Negative for pain and redness.   Respiratory:  Negative for cough and shortness of breath.    Cardiovascular:  Negative for chest pain.   Gastrointestinal:  Negative for abdominal pain, blood in stool, diarrhea, nausea, vomiting and GERD.   Endocrine: Negative for polydipsia and polyuria.   Genitourinary:  Negative for dysuria, genital sores and hematuria.   Musculoskeletal:  Negative for arthralgias, back pain, joint swelling, myalgias, neck pain and neck stiffness.   Skin:  Negative for rash and bruise.   Neurological:  Negative for seizures, weakness, numbness and memory problem.   Hematological:  Negative for adenopathy. Does not bruise/bleed easily.   Psychiatric/Behavioral:  Negative for depressed mood. The patient is not nervous/anxious.         Past Medical History:   Past Medical History:   Diagnosis Date    Anxiety     panic attacks    Low vitamin D level     Osteopenia     Seizure disorder     Seropositive rheumatoid  arthritis        Past Surgical History:   Past Surgical History:   Procedure Laterality Date    APPENDECTOMY      DILATATION AND CURETTAGE         Family History:   Family History   Problem Relation Age of Onset    Osteoporosis Mother     Heart failure Mother     Seizures Mother         disorder    Arthritis Mother     Heart failure Father     Diabetes Father     Arthritis Father     Skin cancer Father     Hypertension Sister     Other Brother         acid reflux       Social History:   Social History     Socioeconomic History    Marital status:    Tobacco Use    Smoking status: Never    Smokeless tobacco: Never   Vaping Use    Vaping status: Never Used   Substance and Sexual Activity    Alcohol use: Never    Drug use: Never    Sexual activity: Defer       Medications:   Current Outpatient Medications:     calcium carbonate (OS-VENANCIO) 1250 (500 Ca) MG tablet, Take 1 tablet by mouth Daily., Disp: , Rfl:     ergocalciferol (ERGOCALCIFEROL) 1.25 MG (49274 UT) capsule, Take 1 capsule by mouth 1 (One) Time Per Week., Disp: 4 capsule, Rfl: 3    estradiol-norethindrone (ACTIVELLA) 1-0.5 MG per tablet, Take 1 tablet by mouth Daily., Disp: , Rfl:     hydroxychloroquine (Plaquenil) 200 MG tablet, Take 1 tablet by mouth Daily., Disp: 90 tablet, Rfl: 1    ibuprofen (ADVIL,MOTRIN) 800 MG tablet, Take 1 tablet by mouth 3 (Three) Times a Day As Needed for Mild Pain. With food, Disp: 90 tablet, Rfl: 0    lamoTRIgine (LaMICtal) 200 MG tablet, Take 1 tablet by mouth 2 (Two) Times a Day., Disp: , Rfl:     leucovorin 10 MG tablet, Take 1 tablet by mouth 1 (One) Time Per Week. Take 24 hrs after methotrexate dose, Disp: 12 tablet, Rfl: 3    methotrexate 2.5 MG tablet, Take 4 tablets by mouth 1 (One) Time Per Week. For rheumatoid arthritis, Disp: 48 tablet, Rfl: 0    primidone (MYSOLINE) 250 MG tablet, Take 1 tablet by mouth 2 (Two) Times a Day. For maintenance, Disp: , Rfl:     Allergies:   Allergies   Allergen Reactions     "Amoxicillin Unknown - Low Severity    Nitrofurantoin Macrocrystal Unknown - Low Severity           Objective        Vital Signs:   Vitals:    07/24/25 1029   BP: 124/74   BP Location: Left arm   Patient Position: Sitting   Cuff Size: Adult   Pulse: 78   Temp: 97.3 °F (36.3 °C)   Weight: 78.5 kg (173 lb)   Height: 160 cm (63\")   PainSc: 1      Body mass index is 30.65 kg/m².      Physical Exam:  Physical Exam   MUSCULOSKELETAL:   No peripheral synovitis    Complete joint exam was performed including the MCPs, PIPs, DIPs of the hands, wrists, elbows, shoulders, hips, knees and ankles.  No soft tissue swelling or tenderness is present except as above.    General: The patient is well-developed and well nourished. Cooperative, alert and oriented. Affect is normal. Hydration appears normal.   HEENT: Normocephalic and atraumatic. Lids and conjunctiva are normal. Pupils are equal and sclera are clear. Oropharynx is clear   NECK neck is supple without adenopathy, masses or thyromegaly.   CARDIOVASCULAR: Regular rate and rhythm. No murmurs, rubs or gallops   LUNGS: Effort is normal. Lungs are clear bilateral   ABDOMEN: Not examined  EXTREMITIES: Peripheral pulses are intact. No clubbing.   SKIN: No rashes. No subcutaneous nodules. No digital ulcers. No sclerodactyly.   NEUROLOGIC: Gait is normal. Strength testing is normal.  No focal neurologic deficits    Results Review:   Labs:   Lab Results   Component Value Date    GLUCOSE 79 06/12/2025    BUN 14 06/12/2025    CREATININE 0.97 06/12/2025    EGFR 68 06/12/2025    BCR 14 06/12/2025    K 4.6 06/12/2025    CO2 22 06/12/2025    CALCIUM 9.7 06/12/2025    ALBUMIN 4.6 06/12/2025    BILITOT 0.3 06/12/2025    AST 18 06/12/2025    ALT 14 06/12/2025     Lab Results   Component Value Date    WBC 7.5 06/12/2025    HGB 14.0 06/12/2025    HCT 42.7 06/12/2025    MCV 99 (H) 06/12/2025     06/12/2025     Lab Results   Component Value Date    SEDRATE 2 06/12/2025     No results found " "for: \"CRP\"  No results found for: \"QUANTIFERO\", \"QUANTITB1\", \"QUANTITB2\", \"QUANTIFERN\", \"QUANTIFERM\", \"QUANTITBGLDP\"  No results found for: \"RF\"  No results found for: \"HEPBSAG\", \"HEPAIGM\", \"HEPBIGMCORE\", \"HEPCVIRUSABY\"      Procedures    Assessment / Plan        -Rheumatoid arthritis with positive rheumatoid factor  Assessment & Plan:   [facility] [place]  referred by hand specialist Dr. Clifford Webster ([facility]) who her  saw for thumb  +RF 41, +CCP antibody>300 (Dec 2019); saw Dr. Palmer once February 2020 2/2020 hand films with mild deg changes  2/2024 hand films with inflammatory changes multiple PIP joints  **Current rx:  -Methotrexate 7/2020, leucovorin, ibuprofen PRN.            -hydroxychloroquine 3/21-12/24, restart by pt 6/25(helped ankle pain)        Low disease activity on methotrexate 10 mg once weekly and plaquenil 200mg qd  Swollen joint count 0. Tender joint count 0.  Good prognosis       -Continue Plaquenil 200 mg daily, methotrexate 10 mg once weekly and once weekly leucovorin.  Well tolerated.    -Labs every 12 weeks for monitoring.   -Labs reviewed from 6/12/25 are stable  Standing order provided today for labs every 12 weeks CBC CMP sed rate CRP.  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  Risks include but are not limited to severe liver damage that can be fatal, the possible need for liver biopsy, bone marrow suppression that can lead to dangerously low blood counts, GI side effects including mouth sores and diarrhea, fatigue, and rare risk of severe pulmonary complications. There should be no alcohol consumed with MTX. MTX can cause severe fetal abnormalities whether the mother or father is taking the medication and thus must be avoided if pregnancy is a possibility.  All medication is " to be taken one day a week only. The need for q 8-12 week labs and the need for folic acid supplementation were discussed    We discussed possible side effects of hydroxychloroquine including headache, nausea, diarrhea, rare retinal pigmentation, rare neuromuscular toxicity.  Recommend eye exams every 6 to 12 months to monitor for retinal toxicity.        -Osteopenia  Assessment & Plan:  DEXA 2020 [facility] - mild osteopenia  DEXA ACL 9/14/2023-normal bone density  Vitamin D weekly helps her hip pain, so she will stay on this indefinitely.     She takes 50,000 IU of vitamin D weekly- She reports when she does not take her weekly vitamin D for a couple weeks, she has severe joint pain and stiffness  Continue calcium vitamin-D and weight-bearing exercise     -CMC osteoarthritis  Assessment & Plan:  Hand films 2/2024 with narrowing of CMC joint space bilaterally     Continue home exercises   Continue ibuprofen.   Can try topical Voltaren gel. Recommend splints.   Can try injection if she would like-she will let us know if she wants this        -Encounter for long-term (current) use of NSAIDs  Assessment & Plan:  Continue ibuprofen as needed     Risks of NSAIDs discussed including GI upset, GI bleeding, renal and hepatic risks and the risks of cardiovascular disease and stroke. Warned patient not to take with other NSAIDs including OTC NSAIDs        -Seizure disorder  Assessment & Plan:  Followed by Neurology in [place]  Stable        1. Rheumatoid arthritis with positive rheumatoid factor, involving unspecified site    2. Encounter for long-term (current) use of high-risk medication    3. Immunosuppression due to drug therapy    4. Vitamin D deficiency        Assessment & Plan        Orders Placed This Encounter   Procedures    Comprehensive Metabolic Panel    CBC Auto Differential    C-reactive Protein    Sedimentation Rate     New Medications Ordered This Visit   Medications    hydroxychloroquine  (Plaquenil) 200 MG tablet     Sig: Take 1 tablet by mouth Daily.     Dispense:  90 tablet     Refill:  1    methotrexate 2.5 MG tablet     Sig: Take 4 tablets by mouth 1 (One) Time Per Week. For rheumatoid arthritis     Dispense:  48 tablet     Refill:  0    leucovorin 10 MG tablet     Sig: Take 1 tablet by mouth 1 (One) Time Per Week. Take 24 hrs after methotrexate dose     Dispense:  12 tablet     Refill:  3       Follow Up:   No follow-ups on file.      Discussed plan of care in detail with the patient today.  Patient verbalized understanding and agrees.    I confirm accuracy of unchanged data/findings which have been carried forward from previous visit.  I have updated appropriately those that have changed.        Regan Burton MD  [facility]